# Patient Record
Sex: FEMALE | Race: BLACK OR AFRICAN AMERICAN | Employment: FULL TIME | URBAN - METROPOLITAN AREA
[De-identification: names, ages, dates, MRNs, and addresses within clinical notes are randomized per-mention and may not be internally consistent; named-entity substitution may affect disease eponyms.]

---

## 2018-01-18 ENCOUNTER — OP HISTORICAL/CONVERTED ENCOUNTER (OUTPATIENT)
Dept: OTHER | Age: 48
End: 2018-01-18

## 2018-09-26 ENCOUNTER — ED HISTORICAL/CONVERTED ENCOUNTER (OUTPATIENT)
Dept: OTHER | Age: 48
End: 2018-09-26

## 2018-10-22 ENCOUNTER — OP HISTORICAL/CONVERTED ENCOUNTER (OUTPATIENT)
Dept: OTHER | Age: 48
End: 2018-10-22

## 2018-10-24 ENCOUNTER — IP HISTORICAL/CONVERTED ENCOUNTER (OUTPATIENT)
Dept: OTHER | Age: 48
End: 2018-10-24

## 2019-01-22 ENCOUNTER — OP HISTORICAL/CONVERTED ENCOUNTER (OUTPATIENT)
Dept: OTHER | Age: 49
End: 2019-01-22

## 2019-09-05 ENCOUNTER — OP HISTORICAL/CONVERTED ENCOUNTER (OUTPATIENT)
Dept: OTHER | Age: 49
End: 2019-09-05

## 2019-09-16 ENCOUNTER — OP HISTORICAL/CONVERTED ENCOUNTER (OUTPATIENT)
Dept: OTHER | Age: 49
End: 2019-09-16

## 2019-09-26 ENCOUNTER — ED HISTORICAL/CONVERTED ENCOUNTER (OUTPATIENT)
Dept: OTHER | Age: 49
End: 2019-09-26

## 2019-11-08 ENCOUNTER — OP HISTORICAL/CONVERTED ENCOUNTER (OUTPATIENT)
Dept: OTHER | Age: 49
End: 2019-11-08

## 2020-01-24 ENCOUNTER — OP HISTORICAL/CONVERTED ENCOUNTER (OUTPATIENT)
Dept: OTHER | Age: 50
End: 2020-01-24

## 2020-01-24 LAB — MAMMOGRAPHY, EXTERNAL: NEGATIVE

## 2021-01-04 PROBLEM — I10 ESSENTIAL HYPERTENSION: Status: ACTIVE | Noted: 2021-01-04

## 2021-01-04 PROBLEM — A60.00 GENITAL HERPES SIMPLEX: Status: ACTIVE | Noted: 2021-01-04

## 2021-01-27 ENCOUNTER — OFFICE VISIT (OUTPATIENT)
Dept: OBGYN CLINIC | Age: 51
End: 2021-01-27
Payer: COMMERCIAL

## 2021-01-27 VITALS
BODY MASS INDEX: 32.5 KG/M2 | SYSTOLIC BLOOD PRESSURE: 126 MMHG | DIASTOLIC BLOOD PRESSURE: 90 MMHG | HEIGHT: 64 IN | WEIGHT: 190.38 LBS

## 2021-01-27 DIAGNOSIS — Z01.419 ROUTINE GYNECOLOGICAL EXAMINATION: Primary | ICD-10-CM

## 2021-01-27 DIAGNOSIS — Z12.31 ENCOUNTER FOR SCREENING MAMMOGRAM FOR MALIGNANT NEOPLASM OF BREAST: ICD-10-CM

## 2021-01-27 DIAGNOSIS — N95.1 MENOPAUSAL SYNDROME: ICD-10-CM

## 2021-01-27 PROCEDURE — 99396 PREV VISIT EST AGE 40-64: CPT | Performed by: OBSTETRICS & GYNECOLOGY

## 2021-01-27 RX ORDER — ESTRADIOL 1 MG/1
1 TABLET ORAL DAILY
Qty: 90 TAB | Refills: 4 | Status: SHIPPED | OUTPATIENT
Start: 2021-01-27 | End: 2022-04-13

## 2021-01-27 RX ORDER — ACETAMINOPHEN 500 MG
TABLET ORAL DAILY
COMMUNITY

## 2021-01-27 RX ORDER — OMEPRAZOLE 10 MG/1
10 CAPSULE, DELAYED RELEASE ORAL DAILY
COMMUNITY

## 2021-01-27 RX ORDER — HYDROCHLOROTHIAZIDE 12.5 MG/1
12.5 TABLET ORAL DAILY
COMMUNITY

## 2021-01-27 NOTE — PROGRESS NOTES
Juliano Davila is a 48 y.o. female who presents today for the following:  Chief Complaint   Patient presents with    Annual Exam     c/o hot flashes        No Known Allergies    Current Outpatient Medications   Medication Sig    cholecalciferol (VITAMIN D3) (2,000 UNITS /50 MCG) cap capsule Take  by mouth daily.  hydroCHLOROthiazide (HYDRODIURIL) 12.5 mg tablet Take 12.5 mg by mouth daily.  omeprazole (PRILOSEC) 10 mg capsule Take 10 mg by mouth daily.  potassium 99 mg tablet Take 99 mg by mouth daily.  estradioL (ESTRACE) 1 mg tablet Take 1 Tab by mouth daily. No current facility-administered medications for this visit.         Past Medical History:   Diagnosis Date    Genital herpes simplex     Hypertension        Past Surgical History:   Procedure Laterality Date    HX HYSTERECTOMY  2008    HX LAPAROTOMY  2012, 2018    HX OOPHORECTOMY Right 10/24/2018       Family History   Problem Relation Age of Onset    Arthritis Mother     Hypertension Mother        Social History     Socioeconomic History    Marital status: SINGLE     Spouse name: Not on file    Number of children: Not on file    Years of education: Not on file    Highest education level: Not on file   Occupational History    Not on file   Social Needs    Financial resource strain: Not on file    Food insecurity     Worry: Not on file     Inability: Not on file    Transportation needs     Medical: Not on file     Non-medical: Not on file   Tobacco Use    Smoking status: Never Smoker    Smokeless tobacco: Never Used   Substance and Sexual Activity    Alcohol use: Yes     Comment: occasional    Drug use: Never    Sexual activity: Yes   Lifestyle    Physical activity     Days per week: Not on file     Minutes per session: Not on file    Stress: Not on file   Relationships    Social connections     Talks on phone: Not on file     Gets together: Not on file     Attends Jehovah's witness service: Not on file     Active member of club or organization: Not on file     Attends meetings of clubs or organizations: Not on file     Relationship status: Not on file    Intimate partner violence     Fear of current or ex partner: Not on file     Emotionally abused: Not on file     Physically abused: Not on file     Forced sexual activity: Not on file   Other Topics Concern    Not on file   Social History Narrative    Not on file         HPI  Here for annual exam.  Patient complains of hot flashes. She has history of hysterectomy. ROS   Review of Systems   Constitutional: Negative. HENT: Negative. Eyes: Negative. Respiratory: Negative. Cardiovascular: Negative. Gastrointestinal: Negative. Genitourinary: Negative. Musculoskeletal: Negative. Skin: Negative. Neurological: Negative. Endo/Heme/Allergies: Negative. Psychiatric/Behavioral: Negative. BP (!) 126/90 (BP 1 Location: Left arm, BP Patient Position: Sitting)   Ht 5' 4\" (1.626 m)   Wt 190 lb 6 oz (86.4 kg)   BMI 32.68 kg/m²    OBGyn Exam   Constitutional  · Appearance: well-nourished, well developed, alert, in no acute distress    HENT  · Head and Face: appears normal    Neck  · Inspection/Palpation: normal appearance, no masses or tenderness  · Lymph Nodes: no lymphadenopathy present  · Thyroid: gland size normal, nontender, no nodules or masses present on palpation    Breasts   Symmetric, no palpable masses, no tenderness, no skin changes, no nipple abnormality, no nipple discharge, no lymphadenopathy.     Chest  · Respiratory Effort: breathing labored  · Auscultation: normal breath sounds    Cardiovascular  · Heart:  · Auscultation: regular rate and rhythm without murmur    Gastrointestinal  · Abdominal Examination: abdomen non-tender to palpation, normal bowel sounds, no masses present  · Liver and spleen: no hepatomegaly present, spleen not palpable  · Hernias: no hernias identified    Genitourinary  · External Genitalia: normal appearance for age, no discharge present, no tenderness present, no inflammatory lesions present, no masses present, no atrophy present  · Vagina: normal vaginal vault without central or paravaginal defects, no discharge present, no inflammatory lesions present, no masses present  · Bladder: non-tender to palpation  · Urethra: appears normal  · Cervix: Absent   · Uterus: Absent  · Adnexa: no adnexal tenderness present, no adnexal masses present  · Perineum: perineum within normal limits, no evidence of trauma, no rashes or skin lesions present  · Anus: anus within normal limits, no hemorrhoids present  · Inguinal Lymph Nodes: no lymphadenopathy present    Skin  · General Inspection: no rash, no lesions identified    Neurologic/Psychiatric  · Mental Status:  · Orientation: grossly oriented to person, place and time  · Mood and Affect: mood normal, affect appropriate    No results found for this visit on 01/27/21. Orders Placed This Encounter    [facility] 3D ISIDORO W MAMMO BI SCREENING INCL CAD     Standing Status:   Future     Standing Expiration Date:   1/27/2022     Order Specific Question:   Is Patient Pregnant? Answer:   No     Order Specific Question:   Reason for Exam     Answer:   Screening    cholecalciferol (VITAMIN D3) (2,000 UNITS /50 MCG) cap capsule     Sig: Take  by mouth daily.  hydroCHLOROthiazide (HYDRODIURIL) 12.5 mg tablet     Sig: Take 12.5 mg by mouth daily.  omeprazole (PRILOSEC) 10 mg capsule     Sig: Take 10 mg by mouth daily.  potassium 99 mg tablet     Sig: Take 99 mg by mouth daily.  estradioL (ESTRACE) 1 mg tablet     Sig: Take 1 Tab by mouth daily. Dispense:  90 Tab     Refill:  4         1. Routine gynecological examination  Reviewed Pap smear/HPV, mammogram, bone density colonoscopy testing guidelines. Encouraged healthy lifestyle. Discussed importanceof getting annual exams.   Discussed the risk of osteoporosis and recommended 1200 to 1500 mg of calcium as well as vitamin D supplementation daily. Recommended monthly self breast exams and instructed and demonstrated to the patient on the proper technique educated on the importance of healthy weight management and the significance of not smoking. 2. Encounter for screening mammogram for malignant neoplasm of breast    - [facility] 3D ISIDORO W MAMMO BI SCREENING INCL CAD; Future    3. Menopausal syndrome    - estradioL (ESTRACE) 1 mg tablet; Take 1 Tab by mouth daily. Dispense: 90 Tab;  Refill: 4        Follow-up and Dispositions    · Return in about 1 year (around 1/27/2022) for Annual Exam.

## 2021-02-09 ENCOUNTER — HOSPITAL ENCOUNTER (OUTPATIENT)
Dept: MAMMOGRAPHY | Age: 51
Discharge: HOME | End: 2021-02-09
Attending: OBSTETRICS & GYNECOLOGY
Payer: COMMERCIAL

## 2021-02-09 DIAGNOSIS — Z12.31 ENCOUNTER FOR SCREENING MAMMOGRAM FOR MALIGNANT NEOPLASM OF BREAST: ICD-10-CM

## 2021-02-09 PROCEDURE — 77063 BREAST TOMOSYNTHESIS BI: CPT

## 2022-03-19 PROBLEM — I10 ESSENTIAL HYPERTENSION: Status: ACTIVE | Noted: 2021-01-04

## 2022-03-19 PROBLEM — A60.00 GENITAL HERPES SIMPLEX: Status: ACTIVE | Noted: 2021-01-04

## 2022-04-07 ENCOUNTER — TRANSCRIBE ORDER (OUTPATIENT)
Dept: SCHEDULING | Age: 52
End: 2022-04-07

## 2022-04-07 DIAGNOSIS — Z12.31 VISIT FOR SCREENING MAMMOGRAM: Primary | ICD-10-CM

## 2022-04-08 ENCOUNTER — HOSPITAL ENCOUNTER (OUTPATIENT)
Dept: MAMMOGRAPHY | Age: 52
Discharge: HOME | End: 2022-04-08
Attending: OBSTETRICS & GYNECOLOGY
Payer: COMMERCIAL

## 2022-04-08 DIAGNOSIS — Z12.31 VISIT FOR SCREENING MAMMOGRAM: ICD-10-CM

## 2022-04-08 PROCEDURE — 77063 BREAST TOMOSYNTHESIS BI: CPT

## 2022-05-20 ENCOUNTER — OFFICE VISIT (OUTPATIENT)
Dept: OBGYN CLINIC | Age: 52
End: 2022-05-20
Payer: COMMERCIAL

## 2022-05-20 VITALS
SYSTOLIC BLOOD PRESSURE: 138 MMHG | HEIGHT: 64 IN | DIASTOLIC BLOOD PRESSURE: 86 MMHG | BODY MASS INDEX: 31.41 KG/M2 | OXYGEN SATURATION: 99 % | WEIGHT: 184 LBS | HEART RATE: 77 BPM | TEMPERATURE: 97.9 F

## 2022-05-20 DIAGNOSIS — Z01.419 ROUTINE GYNECOLOGICAL EXAMINATION: Primary | ICD-10-CM

## 2022-05-20 DIAGNOSIS — N95.1 MENOPAUSAL SYNDROME: ICD-10-CM

## 2022-05-20 DIAGNOSIS — Z12.31 ENCOUNTER FOR SCREENING MAMMOGRAM FOR MALIGNANT NEOPLASM OF BREAST: ICD-10-CM

## 2022-05-20 PROCEDURE — 99396 PREV VISIT EST AGE 40-64: CPT | Performed by: OBSTETRICS & GYNECOLOGY

## 2022-05-20 RX ORDER — PLECANATIDE 3 MG/1
1 TABLET ORAL DAILY
COMMUNITY
Start: 2022-05-05

## 2022-05-20 RX ORDER — CETIRIZINE HCL 10 MG
10 TABLET ORAL DAILY
COMMUNITY
Start: 2022-05-05

## 2022-05-20 RX ORDER — ROSUVASTATIN CALCIUM 5 MG/1
TABLET, COATED ORAL
COMMUNITY
Start: 2022-04-14

## 2022-05-20 RX ORDER — ESTRADIOL 1 MG/1
1 TABLET ORAL DAILY
Qty: 30 TABLET | Refills: 12 | Status: SHIPPED | OUTPATIENT
Start: 2022-05-20 | End: 2023-06-14

## 2022-05-20 NOTE — PROGRESS NOTES
Doug Mohan is a 46 y.o. female who presents today for the following:  Chief Complaint   Patient presents with    Annual Exam        No Known Allergies    Current Outpatient Medications   Medication Sig    Trulance 3 mg tab Take 1 Tablet by mouth daily.  rosuvastatin (CRESTOR) 5 mg tablet TAKE 1 TABLET BY MOUTH EVERY OTHER NIGHT    estradioL (ESTRACE) 1 mg tablet Take 1 Tablet by mouth daily. Indications: \"change of life\" signs    cholecalciferol (VITAMIN D3) (2,000 UNITS /50 MCG) cap capsule Take  by mouth daily.  hydroCHLOROthiazide (HYDRODIURIL) 12.5 mg tablet Take 12.5 mg by mouth daily.  omeprazole (PRILOSEC) 10 mg capsule Take 10 mg by mouth daily.  potassium 99 mg tablet Take 99 mg by mouth daily.  cetirizine (ZYRTEC) 10 mg tablet Take 10 mg by mouth daily. No current facility-administered medications for this visit.        Past Medical History:   Diagnosis Date    Genital herpes simplex     Hypertension        Past Surgical History:   Procedure Laterality Date    HX HYSTERECTOMY  2008    HX LAPAROTOMY  2012, 2018    HX OOPHORECTOMY Right 10/24/2018       Family History   Problem Relation Age of Onset    Arthritis Mother     Hypertension Mother        Social History     Socioeconomic History    Marital status: SINGLE     Spouse name: Not on file    Number of children: Not on file    Years of education: Not on file    Highest education level: Not on file   Occupational History    Not on file   Tobacco Use    Smoking status: Never Smoker    Smokeless tobacco: Never Used   Vaping Use    Vaping Use: Never used   Substance and Sexual Activity    Alcohol use: Yes     Comment: occasional    Drug use: Never    Sexual activity: Yes   Other Topics Concern    Not on file   Social History Narrative    Not on file     Social Determinants of Health     Financial Resource Strain:     Difficulty of Paying Living Expenses: Not on file   Food Insecurity:     Worried About Running Out of Food in the Last Year: Not on file    Ran Out of Food in the Last Year: Not on file   Transportation Needs:     Lack of Transportation (Medical): Not on file    Lack of Transportation (Non-Medical): Not on file   Physical Activity:     Days of Exercise per Week: Not on file    Minutes of Exercise per Session: Not on file   Stress:     Feeling of Stress : Not on file   Social Connections:     Frequency of Communication with Friends and Family: Not on file    Frequency of Social Gatherings with Friends and Family: Not on file    Attends Sabianism Services: Not on file    Active Member of [address] [place] [address] [facility] or Organizations: Not on file    Attends Club or Organization Meetings: Not on file    Marital Status: Not on file   Intimate Partner Violence:     Fear of Current or Ex-Partner: Not on file    Emotionally Abused: Not on file    Physically Abused: Not on file    Sexually Abused: Not on file   Housing Stability:     Unable to Pay for Housing in the Last Year: Not on file    Number of [place] in the Last Year: Not on file    Unstable Housing in the Last Year: Not on file         HPI  Here today for annual exam.  Patient has no complaints. Needs refill on Estrace. ROS   Review of Systems   Constitutional: Negative. HENT: Negative. Eyes: Negative. Respiratory: Negative. Cardiovascular: Negative. Gastrointestinal: Negative. Genitourinary: Negative. Musculoskeletal: Negative. Skin: Negative. Neurological: Negative. Endo/Heme/Allergies: Negative. Psychiatric/Behavioral: Negative.       /86 (BP 1 Location: Left upper arm, BP Patient Position: Sitting, BP Cuff Size: Adult)   Pulse 77   Temp 97.9 °F (36.6 °C) (Temporal)   Ht 5' 4\" (1.626 m)   Wt 184 lb (83.5 kg)   SpO2 99%   BMI 31.58 kg/m²    OBGyn Exam   Constitutional  · Appearance: well-nourished, well developed, alert, in no acute distress    HENT  · Head and Face: appears normal    Neck  · Inspection/Palpation: normal appearance, no masses or tenderness  · Lymph Nodes: no lymphadenopathy present  · Thyroid: gland size normal, nontender, no nodules or masses present on palpation    Breasts   Symmetric, no palpable masses, no tenderness, no skin changes, no nipple abnormality, no nipple discharge, no lymphadenopathy. Chest  · Respiratory Effort: Even and unlabored  · Auscultation: normal breath sounds    Cardiovascular  · Heart:  · Auscultation: regular rate and rhythm without murmur    Gastrointestinal  · Abdominal Examination: abdomen non-tender to palpation, normal bowel sounds, no masses present  · Liver and spleen: no hepatomegaly present, spleen not palpable  · Hernias: no hernias identified    Genitourinary  · External Genitalia: normal appearance for age, no discharge present, no tenderness present, no inflammatory lesions present, no masses present, no atrophy present  · Vagina: normal vaginal vault without central or paravaginal defects, no discharge present, no inflammatory lesions present, no masses present  · Bladder: non-tender to palpation  · Urethra: appears normal  · Cervix: Surgically absent  · Uterus: Surgically absent  · Adnexa: no adnexal tenderness present, no adnexal masses present  · Perineum: perineum within normal limits, no evidence of trauma, no rashes or skin lesions present  · Anus: anus within normal limits, no hemorrhoids present  · Inguinal Lymph Nodes: no lymphadenopathy present    Skin  · General Inspection: no rash, no lesions identified    Neurologic/Psychiatric  · Mental Status:  · Orientation: grossly oriented to person, place and time  · Mood and Affect: mood normal, affect appropriate    No results found for this visit on 05/20/22.      Orders Placed This Encounter    [facility] 3D ISIDORO W MAMMO BI SCREENING INCL CAD     Standing Status:   Future     Standing Expiration Date:   5/20/2023     Order Specific Question:   Reason for Exam     Answer: Screening    cetirizine (ZYRTEC) 10 mg tablet     Sig: Take 10 mg by mouth daily.  Trulance 3 mg tab     Sig: Take 1 Tablet by mouth daily.  rosuvastatin (CRESTOR) 5 mg tablet     Sig: TAKE 1 TABLET BY MOUTH EVERY OTHER NIGHT    estradioL (ESTRACE) 1 mg tablet     Sig: Take 1 Tablet by mouth daily. Indications: \"change of life\" signs     Dispense:  30 Tablet     Refill:  12     PT needs appointment         1. Routine gynecological examination  Discussed importance of getting annual exams. Educated on breast self awareness. Encouraged healthy lifestyle (educated on the importance of healthy weight management and the significance of not smoking). Discussed the risk of osteoporosis and recommended calcium as well as vitamin D supplementation daily. Reviewed cervical cancer screening, mammogram, bone density, colonoscopy testing guidelines. 2. Encounter for screening mammogram for malignant neoplasm of breast    - [facility] 3D ISIDORO W MAMMO BI SCREENING INCL CAD; Future    3. Menopausal syndrome    - estradioL (ESTRACE) 1 mg tablet; Take 1 Tablet by mouth daily. Indications: \"change of life\" signs  Dispense: 30 Tablet;  Refill: 12      Follow-up and Dispositions    · Return in about 1 year (around 5/20/2023) for Annual Exam.

## 2023-04-14 ENCOUNTER — HOSPITAL ENCOUNTER (OUTPATIENT)
Dept: MAMMOGRAPHY | Age: 53
Discharge: HOME | End: 2023-04-14
Payer: COMMERCIAL

## 2023-04-14 DIAGNOSIS — Z12.31 SCREENING MAMMOGRAM FOR BREAST CANCER: ICD-10-CM

## 2023-04-14 PROCEDURE — 77063 BREAST TOMOSYNTHESIS BI: CPT

## 2023-04-17 ENCOUNTER — TRANSCRIBE ORDER (OUTPATIENT)
Dept: SCHEDULING | Age: 53
End: 2023-04-17

## 2023-04-17 DIAGNOSIS — R92.8 ABNORMAL MAMMOGRAM OF LEFT BREAST: Primary | ICD-10-CM

## 2023-04-23 DIAGNOSIS — R92.8 ABNORMAL MAMMOGRAM OF LEFT BREAST: Primary | ICD-10-CM

## 2023-04-23 DIAGNOSIS — Z12.31 VISIT FOR SCREENING MAMMOGRAM: Primary | ICD-10-CM

## 2023-04-24 ENCOUNTER — HOSPITAL ENCOUNTER (OUTPATIENT)
Dept: MAMMOGRAPHY | Age: 53
End: 2023-04-24
Attending: OBSTETRICS & GYNECOLOGY
Payer: COMMERCIAL

## 2023-04-24 ENCOUNTER — HOSPITAL ENCOUNTER (OUTPATIENT)
Dept: MAMMOGRAPHY | Age: 53
Discharge: HOME | End: 2023-04-24
Attending: OBSTETRICS & GYNECOLOGY
Payer: COMMERCIAL

## 2023-04-24 DIAGNOSIS — R92.8 ABNORMAL MAMMOGRAM OF LEFT BREAST: ICD-10-CM

## 2023-04-24 DIAGNOSIS — R92.8 ABNORMAL MAMMOGRAM OF LEFT BREAST: Primary | ICD-10-CM

## 2023-04-24 PROCEDURE — 77061 BREAST TOMOSYNTHESIS UNI: CPT

## 2023-05-15 ENCOUNTER — HOSPITAL ENCOUNTER (EMERGENCY)
Facility: HOSPITAL | Age: 53
Discharge: HOME | End: 2023-05-15
Attending: EMERGENCY MEDICINE
Payer: COMMERCIAL

## 2023-05-15 ENCOUNTER — APPOINTMENT (OUTPATIENT)
Facility: HOSPITAL | Age: 53
End: 2023-05-15
Payer: COMMERCIAL

## 2023-05-15 VITALS
HEIGHT: 64 IN | SYSTOLIC BLOOD PRESSURE: 133 MMHG | WEIGHT: 192 LBS | TEMPERATURE: 98.5 F | BODY MASS INDEX: 32.78 KG/M2 | OXYGEN SATURATION: 99 % | DIASTOLIC BLOOD PRESSURE: 85 MMHG | HEART RATE: 85 BPM | RESPIRATION RATE: 18 BRPM

## 2023-05-15 DIAGNOSIS — R07.9 CHEST PAIN, UNSPECIFIED TYPE: Primary | ICD-10-CM

## 2023-05-15 LAB
ALBUMIN SERPL-MCNC: 3.5 G/DL (ref 3.5–5)
ALBUMIN/GLOB SERPL: 0.8 (ref 1.1–2.2)
ALP SERPL-CCNC: 111 U/L (ref 45–117)
ALT SERPL-CCNC: 29 U/L (ref 12–78)
ANION GAP SERPL CALC-SCNC: 7 MMOL/L (ref 5–15)
AST SERPL W P-5'-P-CCNC: 32 U/L (ref 15–37)
BASOPHILS # BLD: 0 K/UL (ref 0–0.1)
BASOPHILS NFR BLD: 0 % (ref 0–1)
BILIRUB SERPL-MCNC: 0.3 MG/DL (ref 0.2–1)
BUN SERPL-MCNC: 7 MG/DL (ref 6–20)
BUN/CREAT SERPL: 9 (ref 12–20)
CA-I BLD-MCNC: 9.2 MG/DL (ref 8.5–10.1)
CHLORIDE SERPL-SCNC: 102 MMOL/L (ref 97–108)
CO2 SERPL-SCNC: 29 MMOL/L (ref 21–32)
CREAT SERPL-MCNC: 0.74 MG/DL (ref 0.55–1.02)
DIFFERENTIAL METHOD BLD: ABNORMAL
EOSINOPHIL # BLD: 0.1 K/UL (ref 0–0.4)
EOSINOPHIL NFR BLD: 2 % (ref 0–7)
ERYTHROCYTE [DISTWIDTH] IN BLOOD BY AUTOMATED COUNT: 14.7 % (ref 11.5–14.5)
GLOBULIN SER CALC-MCNC: 4.2 G/DL (ref 2–4)
GLUCOSE SERPL-MCNC: 119 MG/DL (ref 65–100)
HCT VFR BLD AUTO: 39.6 % (ref 35–47)
HGB BLD-MCNC: 13.3 G/DL (ref 11.5–16)
IMM GRANULOCYTES # BLD AUTO: 0 K/UL (ref 0–0.04)
IMM GRANULOCYTES NFR BLD AUTO: 0 % (ref 0–0.5)
LYMPHOCYTES # BLD: 2.4 K/UL (ref 0.8–3.5)
LYMPHOCYTES NFR BLD: 48 % (ref 12–49)
MCH RBC QN AUTO: 25.5 PG (ref 26–34)
MCHC RBC AUTO-ENTMCNC: 33.6 G/DL (ref 30–36.5)
MCV RBC AUTO: 75.9 FL (ref 80–99)
MONOCYTES # BLD: 0.6 K/UL (ref 0–1)
MONOCYTES NFR BLD: 13 % (ref 5–13)
NEUTS SEG # BLD: 1.9 K/UL (ref 1.8–8)
NEUTS SEG NFR BLD: 37 % (ref 32–75)
PLATELET # BLD AUTO: 207 K/UL (ref 150–400)
PMV BLD AUTO: 11.2 FL (ref 8.9–12.9)
POTASSIUM SERPL-SCNC: 3.9 MMOL/L (ref 3.5–5.1)
PROT SERPL-MCNC: 7.7 G/DL (ref 6.4–8.2)
RBC # BLD AUTO: 5.22 M/UL (ref 3.8–5.2)
SODIUM SERPL-SCNC: 138 MMOL/L (ref 136–145)
TROPONIN I SERPL HS-MCNC: 5 NG/L (ref 0–51)
WBC # BLD AUTO: 5 K/UL (ref 3.6–11)

## 2023-05-15 PROCEDURE — 71045 X-RAY EXAM CHEST 1 VIEW: CPT

## 2023-05-15 PROCEDURE — 93005 ELECTROCARDIOGRAM TRACING: CPT | Performed by: EMERGENCY MEDICINE

## 2023-05-15 PROCEDURE — 99285 EMERGENCY DEPT VISIT HI MDM: CPT

## 2023-05-15 PROCEDURE — 85025 COMPLETE CBC W/AUTO DIFF WBC: CPT

## 2023-05-15 PROCEDURE — 84484 ASSAY OF TROPONIN QUANT: CPT

## 2023-05-15 PROCEDURE — 80053 COMPREHEN METABOLIC PANEL: CPT

## 2023-05-15 ASSESSMENT — PAIN - FUNCTIONAL ASSESSMENT: PAIN_FUNCTIONAL_ASSESSMENT: 0-10

## 2023-05-15 ASSESSMENT — LIFESTYLE VARIABLES
HOW MANY STANDARD DRINKS CONTAINING ALCOHOL DO YOU HAVE ON A TYPICAL DAY: PATIENT DOES NOT DRINK
HOW OFTEN DO YOU HAVE A DRINK CONTAINING ALCOHOL: NEVER

## 2023-05-15 ASSESSMENT — PAIN SCALES - GENERAL: PAINLEVEL_OUTOF10: 5

## 2023-05-15 ASSESSMENT — PAIN DESCRIPTION - LOCATION: LOCATION: CHEST

## 2023-05-15 NOTE — ED PROVIDER NOTES
and preliminarily interpreted by the ED Physician with the following findings: Xray Interpreted by me. Shows no acute process    Interpretation per the Radiologist below, if available at the time of this note:  XR CHEST PORTABLE   Final Result      No acute process on portable chest.              ED COURSE and DIFFERENTIAL DIAGNOSIS/MDM   CC/HPI Summary, DDx, ED Course, and Reassessment: 46 y.o. female history of hypertension presenting with chest pain, shortness of breath, headache. While the spectrum of DDx includes ACS, pna,  viral syndrome, dehydration,   The others are less likely. Will obtain labs, CXR, EKG. Work-up is reassuring with no evidence of pneumonia, pulmonary edema, pneumothorax. No significant evidence of dehydration or electrolyte abnormality on labs. Suspect possible viral syndrome versus musculoskeletal pain. Will discharge patient to follow-up with her PCP. Discussed return precautions. Clinical Management Tools:  Not Applicable    Records Reviewed (source and summary of external notes): Prior medical records and Nursing notes    Vitals:    Vitals:    05/15/23 1559   BP: 133/85   Pulse: 85   Resp: 18   Temp: 98.5 °F (36.9 °C)   TempSrc: Oral   SpO2: 99%   Weight: 87.1 kg (192 lb)   Height: 1.626 m (5' 4\")        ED COURSE  ED Course as of 05/16/23 1653   Mon May 15, 2023   1740 Troponin, High Sensitivity: 5  Negative troponin in setting of 3 days of pain [KK]      ED Course User Index  [KK] Timmy Higginbotham MD       Disposition Considerations (Tests not done, Shared Decision Making, Pt Expectation of Test or Treatment.): See MDM    Patient was given the following medications:  Medications - No data to display    CONSULTS: (Who and What was discussed)  None     Social Determinants affecting Dx or Tx: None    Smoking Cessation: Not Applicable    PROCEDURES   Unless otherwise noted above, none.   Procedures      CRITICAL CARE TIME   Patient does not meet Critical Care Time, 0

## 2023-05-15 NOTE — DISCHARGE INSTRUCTIONS
Phosphatase 111 45 - 117 U/L    Total Protein 7.7 6.4 - 8.2 g/dL    Albumin 3.5 3.5 - 5.0 g/dL    Globulin 4.2 (H) 2.0 - 4.0 g/dL    Albumin/Globulin Ratio 0.8 (L) 1.1 - 2.2     Troponin    Collection Time: 05/15/23  4:08 PM   Result Value Ref Range    Troponin, High Sensitivity 5 0 - 51 ng/L       Radiologic Studies  XR CHEST PORTABLE    (Results Pending)     ------------------------------------------------------------------------------------------------------------  The exam and treatment you received in the Emergency Department were for an urgent problem and are not intended as complete care. It is important that you follow-up with a doctor, nurse practitioner, or physician assistant to:  (1) confirm your diagnosis,  (2) re-evaluation of changes in your illness and treatment, and  (3) for ongoing care. Please take your discharge instructions with you when you go to your follow-up appointment. If you have any problem arranging a follow-up appointment, contact the Emergency Department. If your symptoms become worse or you do not improve as expected and you are unable to reach your health care provider, please return to the Emergency Department. We are available 24 hours a day. If a prescription has been provided, please have it filled as soon as possible to prevent a delay in treatment. If you have any questions or reservations about taking the medication due to side effects or interactions with other medications, please call your primary care provider or contact the ER.

## 2023-05-15 NOTE — ED TRIAGE NOTES
Pt c/o of chest pain for last few days, sob of breath, generalized edema and bilateral hand numbness.  Denies n/v

## 2023-05-16 LAB
EKG ATRIAL RATE: 78 BPM
EKG DIAGNOSIS: NORMAL
EKG P AXIS: 17 DEGREES
EKG P-R INTERVAL: 152 MS
EKG Q-T INTERVAL: 370 MS
EKG QRS DURATION: 90 MS
EKG QTC CALCULATION (BAZETT): 421 MS
EKG R AXIS: 58 DEGREES
EKG T AXIS: 14 DEGREES
EKG VENTRICULAR RATE: 78 BPM

## 2023-05-18 RX ORDER — HYDROCHLOROTHIAZIDE 12.5 MG/1
12.5 TABLET ORAL DAILY
COMMUNITY

## 2023-05-18 RX ORDER — ROSUVASTATIN CALCIUM 5 MG/1
TABLET, COATED ORAL
COMMUNITY
Start: 2022-04-14

## 2023-05-18 RX ORDER — PLECANATIDE 3 MG/1
1 TABLET ORAL DAILY
COMMUNITY
Start: 2022-05-05

## 2023-05-18 RX ORDER — OMEPRAZOLE 10 MG/1
10 CAPSULE, DELAYED RELEASE ORAL DAILY
COMMUNITY

## 2023-05-18 RX ORDER — ESTRADIOL 1 MG/1
1 TABLET ORAL DAILY
Qty: 30 TABLET | Refills: 12 | COMMUNITY
Start: 2023-04-06 | End: 2024-04-30

## 2023-05-18 RX ORDER — CETIRIZINE HYDROCHLORIDE 10 MG/1
10 TABLET ORAL DAILY
COMMUNITY
Start: 2022-05-05

## 2023-09-06 DIAGNOSIS — N95.1 MENOPAUSAL AND FEMALE CLIMACTERIC STATES: ICD-10-CM

## 2023-09-06 RX ORDER — ESTRADIOL 1 MG/1
TABLET ORAL
Qty: 30 TABLET | Refills: 1 | Status: SHIPPED | OUTPATIENT
Start: 2023-09-06

## 2023-10-16 ENCOUNTER — HOSPITAL ENCOUNTER (OUTPATIENT)
Facility: HOSPITAL | Age: 53
Discharge: HOME | End: 2023-10-19
Payer: COMMERCIAL

## 2023-10-16 ENCOUNTER — TRANSCRIBE ORDERS (OUTPATIENT)
Facility: HOSPITAL | Age: 53
End: 2023-10-16

## 2023-10-16 DIAGNOSIS — R06.02 SHORTNESS OF BREATH: ICD-10-CM

## 2023-10-16 DIAGNOSIS — R06.02 SHORTNESS OF BREATH: Primary | ICD-10-CM

## 2023-10-16 PROCEDURE — 71046 X-RAY EXAM CHEST 2 VIEWS: CPT

## 2023-11-01 ENCOUNTER — TRANSCRIBE ORDERS (OUTPATIENT)
Facility: HOSPITAL | Age: 53
End: 2023-11-01

## 2023-11-01 ENCOUNTER — HOSPITAL ENCOUNTER (OUTPATIENT)
Facility: HOSPITAL | Age: 53
Discharge: HOME | End: 2023-11-04
Payer: COMMERCIAL

## 2023-11-01 DIAGNOSIS — K59.00 CONSTIPATION, UNSPECIFIED CONSTIPATION TYPE: ICD-10-CM

## 2023-11-01 DIAGNOSIS — R10.9 ABDOMINAL PAIN, UNSPECIFIED ABDOMINAL LOCATION: ICD-10-CM

## 2023-11-01 DIAGNOSIS — R14.0 BLOATING: Primary | ICD-10-CM

## 2023-11-01 DIAGNOSIS — R14.0 BLOATING: ICD-10-CM

## 2023-11-01 PROCEDURE — 74018 RADEX ABDOMEN 1 VIEW: CPT

## 2023-11-15 DIAGNOSIS — N95.1 MENOPAUSAL AND FEMALE CLIMACTERIC STATES: ICD-10-CM

## 2023-11-15 RX ORDER — ESTRADIOL 1 MG/1
TABLET ORAL
Qty: 30 TABLET | Refills: 1 | OUTPATIENT
Start: 2023-11-15

## 2024-04-17 ENCOUNTER — OFFICE VISIT (OUTPATIENT)
Age: 54
End: 2024-04-17
Payer: COMMERCIAL

## 2024-04-17 VITALS — WEIGHT: 203.8 LBS | SYSTOLIC BLOOD PRESSURE: 125 MMHG | DIASTOLIC BLOOD PRESSURE: 84 MMHG | BODY MASS INDEX: 34.98 KG/M2

## 2024-04-17 DIAGNOSIS — Z12.31 ENCOUNTER FOR SCREENING MAMMOGRAM FOR MALIGNANT NEOPLASM OF BREAST: ICD-10-CM

## 2024-04-17 DIAGNOSIS — Z01.419 ENCOUNTER FOR ANNUAL ROUTINE GYNECOLOGICAL EXAMINATION: Primary | ICD-10-CM

## 2024-04-17 PROCEDURE — 3079F DIAST BP 80-89 MM HG: CPT | Performed by: OBSTETRICS & GYNECOLOGY

## 2024-04-17 PROCEDURE — 99396 PREV VISIT EST AGE 40-64: CPT | Performed by: OBSTETRICS & GYNECOLOGY

## 2024-04-17 PROCEDURE — 3074F SYST BP LT 130 MM HG: CPT | Performed by: OBSTETRICS & GYNECOLOGY

## 2024-04-17 NOTE — PROGRESS NOTES
Marybeth is a 53 y.o. female who presents today for the following:    Chief Complaint   Patient presents with    Annual Exam        No Known Allergies       Current Outpatient Medications:     estradiol (ESTRACE) 1 MG tablet, TAKE 1 TABLET BY MOUTH DAILY FOR \"CHANGE OF LIFE\" SIGNS, Disp: 30 tablet, Rfl: 1    cetirizine (ZYRTEC) 10 MG tablet, Take 1 tablet by mouth daily, Disp: , Rfl:     Cholecalciferol 50 MCG (2000 UT) CAPS, Take by mouth daily, Disp: , Rfl:     hydroCHLOROthiazide (HYDRODIURIL) 12.5 MG tablet, Take 1 tablet by mouth daily, Disp: , Rfl:     omeprazole (PRILOSEC) 10 MG delayed release capsule, Take 1 capsule by mouth daily, Disp: , Rfl:     Plecanatide (TRULANCE) 3 MG TABS, Take 1 tablet by mouth daily, Disp: , Rfl:     potassium gluconate 550 mg tablet, Take 99 mg by mouth daily, Disp: , Rfl:     rosuvastatin (CRESTOR) 5 MG tablet, TAKE 1 TABLET BY MOUTH EVERY OTHER NIGHT, Disp: , Rfl:      Past Medical History:   Diagnosis Date    Genital herpes simplex     Hypertension         Past Surgical History:   Procedure Laterality Date    HYSTERECTOMY (CERVIX STATUS UNKNOWN)  2008    LAPAROTOMY  2012, 2018    OVARY REMOVAL Right 10/24/2018        Family History   Problem Relation Age of Onset    Arthritis Mother     Hypertension Mother     Diabetes Mother         Social History     Socioeconomic History    Marital status: Single     Spouse name: None    Number of children: None    Years of education: None    Highest education level: None   Tobacco Use    Smoking status: Never    Smokeless tobacco: Never   Vaping Use    Vaping Use: Never used   Substance and Sexual Activity    Alcohol use: Yes     Alcohol/week: 2.0 standard drinks of alcohol     Types: 2 Drinks containing 0.5 oz of alcohol per week    Drug use: Never    Sexual activity: Yes        HPI  53 years old patient presented today for annual exam.  Patient complains of occasional right-sided pelvic pain.  She is not on estrogen replacement 
Patient/Caregiver provided printed discharge information.

## 2024-04-19 ENCOUNTER — HOSPITAL ENCOUNTER (OUTPATIENT)
Facility: HOSPITAL | Age: 54
Discharge: HOME | End: 2024-04-19
Payer: COMMERCIAL

## 2024-04-19 ENCOUNTER — TRANSCRIBE ORDERS (OUTPATIENT)
Facility: HOSPITAL | Age: 54
End: 2024-04-19

## 2024-04-19 VITALS — BODY MASS INDEX: 34.66 KG/M2 | WEIGHT: 203 LBS | HEIGHT: 64 IN

## 2024-04-19 DIAGNOSIS — Z12.31 VISIT FOR SCREENING MAMMOGRAM: Primary | ICD-10-CM

## 2024-04-19 DIAGNOSIS — Z12.31 ENCOUNTER FOR SCREENING MAMMOGRAM FOR MALIGNANT NEOPLASM OF BREAST: ICD-10-CM

## 2024-04-19 PROCEDURE — 77063 BREAST TOMOSYNTHESIS BI: CPT

## 2024-04-19 PROCEDURE — 77067 SCR MAMMO BI INCL CAD: CPT

## 2024-10-27 ENCOUNTER — HOSPITAL ENCOUNTER (EMERGENCY)
Facility: HOSPITAL | Age: 54
Discharge: HOME | End: 2024-10-27
Attending: FAMILY MEDICINE
Payer: COMMERCIAL

## 2024-10-27 ENCOUNTER — APPOINTMENT (OUTPATIENT)
Facility: HOSPITAL | Age: 54
End: 2024-10-27
Payer: COMMERCIAL

## 2024-10-27 VITALS
TEMPERATURE: 98.1 F | WEIGHT: 206 LBS | HEIGHT: 64 IN | SYSTOLIC BLOOD PRESSURE: 151 MMHG | DIASTOLIC BLOOD PRESSURE: 95 MMHG | RESPIRATION RATE: 16 BRPM | HEART RATE: 51 BPM | OXYGEN SATURATION: 97 % | BODY MASS INDEX: 35.17 KG/M2

## 2024-10-27 DIAGNOSIS — R51.9 NONINTRACTABLE EPISODIC HEADACHE, UNSPECIFIED HEADACHE TYPE: Primary | ICD-10-CM

## 2024-10-27 PROCEDURE — 70450 CT HEAD/BRAIN W/O DYE: CPT

## 2024-10-27 PROCEDURE — 96372 THER/PROPH/DIAG INJ SC/IM: CPT

## 2024-10-27 PROCEDURE — 6360000002 HC RX W HCPCS: Performed by: FAMILY MEDICINE

## 2024-10-27 PROCEDURE — 99284 EMERGENCY DEPT VISIT MOD MDM: CPT

## 2024-10-27 PROCEDURE — 6370000000 HC RX 637 (ALT 250 FOR IP): Performed by: FAMILY MEDICINE

## 2024-10-27 RX ORDER — SENNOSIDES 8.6 MG
650 CAPSULE ORAL EVERY 8 HOURS PRN
Qty: 12 TABLET | Refills: 0 | Status: SHIPPED | OUTPATIENT
Start: 2024-10-27 | End: 2024-10-31

## 2024-10-27 RX ORDER — KETOROLAC TROMETHAMINE 30 MG/ML
30 INJECTION, SOLUTION INTRAMUSCULAR; INTRAVENOUS
Status: COMPLETED | OUTPATIENT
Start: 2024-10-27 | End: 2024-10-27

## 2024-10-27 RX ORDER — BUTALBITAL, ACETAMINOPHEN AND CAFFEINE 50; 325; 40 MG/1; MG/1; MG/1
2 TABLET ORAL ONCE
Status: COMPLETED | OUTPATIENT
Start: 2024-10-27 | End: 2024-10-27

## 2024-10-27 RX ORDER — IBUPROFEN 800 MG/1
800 TABLET, FILM COATED ORAL EVERY 8 HOURS PRN
Qty: 12 TABLET | Refills: 0 | Status: SHIPPED | OUTPATIENT
Start: 2024-10-27 | End: 2024-11-01

## 2024-10-27 RX ADMIN — KETOROLAC TROMETHAMINE 30 MG: 30 INJECTION, SOLUTION INTRAMUSCULAR; INTRAVENOUS at 12:23

## 2024-10-27 RX ADMIN — BUTALBITAL, ACETAMINOPHEN, AND CAFFEINE 2 TABLET: 325; 50; 40 TABLET ORAL at 10:57

## 2024-10-27 ASSESSMENT — PAIN SCALES - GENERAL
PAINLEVEL_OUTOF10: 6
PAINLEVEL_OUTOF10: 6

## 2024-10-27 ASSESSMENT — PAIN DESCRIPTION - FREQUENCY: FREQUENCY: CONTINUOUS

## 2024-10-27 ASSESSMENT — PAIN DESCRIPTION - ORIENTATION: ORIENTATION: RIGHT

## 2024-10-27 ASSESSMENT — PAIN DESCRIPTION - LOCATION: LOCATION: HEAD

## 2024-10-27 ASSESSMENT — PAIN DESCRIPTION - DESCRIPTORS
DESCRIPTORS: SHARP
DESCRIPTORS: SHARP

## 2024-10-27 ASSESSMENT — PAIN - FUNCTIONAL ASSESSMENT
PAIN_FUNCTIONAL_ASSESSMENT: 0-10
PAIN_FUNCTIONAL_ASSESSMENT: 0-10

## 2024-10-27 NOTE — ED PROVIDER NOTES
EMERGENCY DEPARTMENT HISTORY AND PHYSICAL EXAM       Date: 10/27/2024  Patient Name: Marybeth Ceballos    History of Presenting Illness     No chief complaint on file.      History Provided By:     HPI: Marybeth Ceballos, is an extremely pleasant 54 y.o. female presenting to the ED with a chief complaint of headache.  Patient endorses onset of headache that started this morning.  Patient characterizes pain as sharp over left side of head.  No eye pain.  Denies abrupt nor worst headache of life.  No identifiable alleviating or aggravating factors.  No neck stiffness nor fevers.  No visual changes.  No temporal pain.  No numbness, tingling nor weakness in extremities.  Patient declines any recent head nor neck injuries.    There are no other complaints, changes, or physical findings at this time.    PCP: Velasquez Warren MD    No current facility-administered medications on file prior to encounter.     Current Outpatient Medications on File Prior to Encounter   Medication Sig Dispense Refill    cetirizine (ZYRTEC) 10 MG tablet Take 1 tablet by mouth daily      hydroCHLOROthiazide (HYDRODIURIL) 12.5 MG tablet Take 1 tablet by mouth daily      omeprazole (PRILOSEC) 10 MG delayed release capsule Take 1 capsule by mouth daily      Plecanatide (TRULANCE) 3 MG TABS Take 1 tablet by mouth daily      potassium gluconate 550 mg tablet Take 99 mg by mouth daily      rosuvastatin (CRESTOR) 5 MG tablet TAKE 1 TABLET BY MOUTH EVERY OTHER NIGHT      estradiol (ESTRACE) 1 MG tablet TAKE 1 TABLET BY MOUTH DAILY FOR \"CHANGE OF LIFE\" SIGNS (Patient not taking: Reported on 10/27/2024) 30 tablet 1    Cholecalciferol 50 MCG (2000 UT) CAPS Take by mouth daily (Patient not taking: Reported on 10/27/2024)         Past History     Past Medical History:  Past Medical History:   Diagnosis Date    Genital herpes simplex     Hypertension        Past Surgical History:  Past Surgical History:   Procedure Laterality Date    HYSTERECTOMY

## 2024-10-27 NOTE — DISCHARGE INSTRUCTIONS
Thank you for choosing our Emergency Department for your care.  It is our privilege to care for you in your time of need.  In the next several days, you may receive a survey via email or mailed to your home about your experience with our team.  We would greatly appreciate you taking a few minutes to complete the survey, as we use this information to learn what we have done well and what we could be doing better. Thank you for trusting us with your care!    Below you will find a list of your tests from today's visit.   Labs  No results found for this or any previous visit (from the past 12 hour(s)).    Radiologic Studies  CT HEAD WO CONTRAST   Final Result   No evidence of acute process.            Electronically signed by ELIZA CUNHA        ------------------------------------------------------------------------------------------------------------  The evaluation and treatment you received in the Emergency Department were for an urgent problem. It is important that you follow-up with a doctor, nurse practitioner, or physician assistant to:  (1) confirm your diagnosis,  (2) re-evaluation of changes in your illness and treatment, and (3) for ongoing care. Please take your discharge instructions with you when you go to your follow-up appointment.     If you have any problem arranging a follow-up appointment, contact us!  If your symptoms become worse or you do not improve as expected, please return to us. We are available 24 hours a day.     If a prescription has been provided, please fill it as soon as possible to prevent a delay in treatment. If you have any questions or reservations about taking the medication due to side effects or interactions with other medications, please call your primary care provider or contact us directly.  Again, THANK YOU for choosing us to care for YOU!

## 2024-10-27 NOTE — ED TRIAGE NOTES
Started 0705 driving and sharp pain over right eye, didn't affect vision lasted 2 minutes  Then 0810 left lateral neck and lower jaw pain and shoulder sharp shooting pain lasted 2 minutes  Now has dull headache  NIH 0.

## 2025-06-12 ENCOUNTER — RESULTS FOLLOW-UP (OUTPATIENT)
Age: 55
End: 2025-06-12

## 2025-06-12 ENCOUNTER — OFFICE VISIT (OUTPATIENT)
Age: 55
End: 2025-06-12
Payer: COMMERCIAL

## 2025-06-12 ENCOUNTER — HOSPITAL ENCOUNTER (OUTPATIENT)
Facility: HOSPITAL | Age: 55
Discharge: HOME | End: 2025-06-15
Payer: COMMERCIAL

## 2025-06-12 VITALS
BODY MASS INDEX: 35.52 KG/M2 | HEIGHT: 64 IN | SYSTOLIC BLOOD PRESSURE: 149 MMHG | DIASTOLIC BLOOD PRESSURE: 86 MMHG | WEIGHT: 208.06 LBS

## 2025-06-12 DIAGNOSIS — Z12.31 VISIT FOR SCREENING MAMMOGRAM: ICD-10-CM

## 2025-06-12 DIAGNOSIS — Z01.419 ENCOUNTER FOR ANNUAL ROUTINE GYNECOLOGICAL EXAMINATION: Primary | ICD-10-CM

## 2025-06-12 PROCEDURE — 99396 PREV VISIT EST AGE 40-64: CPT | Performed by: OBSTETRICS & GYNECOLOGY

## 2025-06-12 PROCEDURE — 3079F DIAST BP 80-89 MM HG: CPT | Performed by: OBSTETRICS & GYNECOLOGY

## 2025-06-12 PROCEDURE — 3077F SYST BP >= 140 MM HG: CPT | Performed by: OBSTETRICS & GYNECOLOGY

## 2025-06-12 PROCEDURE — 77063 BREAST TOMOSYNTHESIS BI: CPT

## 2025-06-12 NOTE — PROGRESS NOTES
no adnexal masses palpated  .  Perineum: Perineum with in normal limits, no evidence of trauma, no rashes or skin lesions present  .  Anus: Within normal limits, no hemorrhoids present  .  Inguinal lymph nodes: No lymphadenopathy present    Skin  .  General inspection: No rash, no lesions identified    Neurologic/psychiatric  .  Mental status:   .  Orientation: Grossly oriented to person, place and time   .  Mood and affect: Normal mood, affect appropriate    Chaperone was present for intimate exam.    No results found for this visit on 06/12/25.    No orders of the defined types were placed in this encounter.       ASSESSMENT:    1. Encounter for annual routine gynecological examination         Return in about 1 year (around 6/12/2026) for Annual exam.     Facundo Duenas M.D.  [facility]  Obstetrics and Gynecology  [phone]